# Patient Record
Sex: FEMALE | URBAN - METROPOLITAN AREA
[De-identification: names, ages, dates, MRNs, and addresses within clinical notes are randomized per-mention and may not be internally consistent; named-entity substitution may affect disease eponyms.]

---

## 2023-05-24 ENCOUNTER — OFFICE VISIT (OUTPATIENT)
Dept: URBAN - METROPOLITAN AREA CLINIC 5 | Facility: CLINIC | Age: 58
End: 2023-05-24
Payer: MEDICAID

## 2023-05-24 DIAGNOSIS — H25.13 AGE-RELATED NUCLEAR CATARACT, BILATERAL: ICD-10-CM

## 2023-05-24 DIAGNOSIS — H11.442 CONJUNCTIVAL CYSTS, LEFT EYE: Primary | ICD-10-CM

## 2023-05-24 PROCEDURE — 99204 OFFICE O/P NEW MOD 45 MIN: CPT | Performed by: OPTOMETRIST

## 2023-05-24 RX ORDER — NEOMYCIN SULFATE, POLYMYXIN B SULFATE AND DEXAMETHASONE 3.5; 10000; 1 MG/ML; [USP'U]/ML; MG/ML
SUSPENSION OPHTHALMIC
Qty: 5 | Refills: 0 | Status: ACTIVE
Start: 2023-05-24

## 2023-05-24 ASSESSMENT — INTRAOCULAR PRESSURE
OD: 12
OS: 11

## 2023-05-24 NOTE — IMPRESSION/PLAN
Impression: Conjunctival cysts, left eye: H11.442. Plan: Advised patient of condition. Placed drop of proparacaine left eye and drained cyst in office with 20 gauge needle. placed maxitrol NAT. Start Maxitrol TID OS x 1 week. Will schedule procedure with Dr Doris Rodas in 3 weeks. If cyst does not come back patient may cancel appointment.